# Patient Record
Sex: MALE | Race: WHITE | NOT HISPANIC OR LATINO | Employment: FULL TIME | URBAN - METROPOLITAN AREA
[De-identification: names, ages, dates, MRNs, and addresses within clinical notes are randomized per-mention and may not be internally consistent; named-entity substitution may affect disease eponyms.]

---

## 2024-04-29 ENCOUNTER — OFFICE VISIT (OUTPATIENT)
Dept: NEUROLOGY | Facility: CLINIC | Age: 59
End: 2024-04-29
Payer: COMMERCIAL

## 2024-04-29 VITALS
DIASTOLIC BLOOD PRESSURE: 84 MMHG | SYSTOLIC BLOOD PRESSURE: 128 MMHG | TEMPERATURE: 96.8 F | HEIGHT: 70 IN | HEART RATE: 68 BPM | WEIGHT: 227 LBS | RESPIRATION RATE: 16 BRPM | BODY MASS INDEX: 32.5 KG/M2

## 2024-04-29 DIAGNOSIS — G47.33 OSA (OBSTRUCTIVE SLEEP APNEA): Primary | ICD-10-CM

## 2024-04-29 DIAGNOSIS — G44.221 CHRONIC TENSION-TYPE HEADACHE, INTRACTABLE: ICD-10-CM

## 2024-04-29 DIAGNOSIS — R06.83 SNORING: ICD-10-CM

## 2024-04-29 PROCEDURE — 1036F TOBACCO NON-USER: CPT | Performed by: PSYCHIATRY & NEUROLOGY

## 2024-04-29 PROCEDURE — 99215 OFFICE O/P EST HI 40 MIN: CPT | Performed by: PSYCHIATRY & NEUROLOGY

## 2024-04-29 RX ORDER — ROSUVASTATIN CALCIUM 20 MG/1
20 TABLET, COATED ORAL DAILY
COMMUNITY
Start: 2016-08-24

## 2024-04-29 RX ORDER — HYDROXYZINE HYDROCHLORIDE 25 MG/1
25 TABLET, FILM COATED ORAL EVERY 4 HOURS PRN
COMMUNITY
Start: 2023-12-19

## 2024-04-29 RX ORDER — SILDENAFIL 50 MG/1
50 TABLET, FILM COATED ORAL AS NEEDED
COMMUNITY
Start: 2021-09-22

## 2024-04-29 RX ORDER — INSULIN LISPRO-AABC 100 [IU]/ML
INJECTION, SOLUTION INTRAVENOUS; SUBCUTANEOUS
COMMUNITY
Start: 2021-04-23

## 2024-04-29 RX ORDER — CETIRIZINE HYDROCHLORIDE 10 MG/1
10 TABLET ORAL DAILY
COMMUNITY

## 2024-04-29 RX ORDER — EPINEPHRINE 0.22MG
100 AEROSOL WITH ADAPTER (ML) INHALATION DAILY
COMMUNITY
Start: 2017-11-06

## 2024-04-29 RX ORDER — KETOCONAZOLE 20 MG/G
CREAM TOPICAL
COMMUNITY
Start: 2024-03-06

## 2024-04-29 RX ORDER — BISMUTH SUBSALICYLATE 262 MG
1 TABLET,CHEWABLE ORAL DAILY
COMMUNITY

## 2024-04-29 RX ORDER — CLOBETASOL PROPIONATE 0.5 MG/G
CREAM TOPICAL
COMMUNITY
Start: 2024-03-06

## 2024-04-29 RX ORDER — GABAPENTIN 300 MG/1
300 CAPSULE ORAL 3 TIMES DAILY
Qty: 30 CAPSULE | Refills: 2 | Status: SHIPPED | OUTPATIENT
Start: 2024-04-29 | End: 2025-04-29

## 2024-04-29 RX ORDER — OMEPRAZOLE 40 MG/1
40 CAPSULE, DELAYED RELEASE ORAL
COMMUNITY
Start: 2023-08-07

## 2024-04-29 ASSESSMENT — ENCOUNTER SYMPTOMS
LOSS OF SENSATION IN FEET: 0
OCCASIONAL FEELINGS OF UNSTEADINESS: 0
DEPRESSION: 0

## 2024-04-29 NOTE — PATIENT INSTRUCTIONS
The patient is tolerating his BiPAP better but he is continuing to have headaches..  I do need a copy of the patient's most recent compliance report.  The patient needs to continue to change his mask and clean his machine on a regular basis.  The patient needs to lose weight to his ideal body weight, avoid supine position, improve his sleep hygiene and get a least 8 hours of sleep at night.  The patient should not drive while drowsy.  The patient needs an MRA of the brain without contrast.  I will give him a trial of gabapentin 300 mg p.o. 3 times daily.  I did warn him of the possibility of sedation, weight gain, lower extremity edema and dizziness with this medicine.  The patient should continue all of his medicines and stroke risk factor modification.  The patient should use symptomatic pain medicines for severe headaches.  The patient does need to continue to follow-up with his dentist.  I discussed all these issues in detail with the patient and answered all of his questions.  The patient will follow-up with me in 6 months.

## 2024-04-29 NOTE — PROGRESS NOTES
Subjective     Carmelo Raymond 58 y.o.  HPI  The patient states that he has lights out between 10 PM and 11 PM and it takes him about 30 to 45 minutes to fall asleep.  The patient will wake for the day 7:10 AM.  The patient's [place] Sleepiness Scale score today was 5.  The patient states that he gets up at a minimum of 2-5 times per night.  The patient states that at times his sleep is refreshing and that at times it is not.  I do not have a copy of his latest compliance report.  He is changing his mask and cleaning his machine on a regular basis.  He states that he has tried at least 8 different masks and is now able to use his BiPAP for about 7 hours a night.    The patient states that he has had daily headaches for about the last year and a half.  The patient did have an MRI of the brain back on March 22, 2023 that was normal.  The patient does feel that he still has numbness in the roof of his mouth and this has been present since he had his dental implants.    Review of Systems   All other systems reviewed and are negative.       There is no problem list on file for this patient.       Past Medical History:   Diagnosis Date    Celiac disease ([facility])         Past Surgical History:   Procedure Laterality Date    COLONOSCOPY      UPPER GASTROINTESTINAL ENDOSCOPY          Social History     Socioeconomic History    Marital status:      Spouse name: Not on file    Number of children: Not on file    Years of education: Not on file    Highest education level: Not on file   Occupational History    Not on file   Tobacco Use    Smoking status: Never     Passive exposure: Never    Smokeless tobacco: Former     Types: Chew   Vaping Use    Vaping status: Never Used   Substance and Sexual Activity    Alcohol use: Yes     Comment: moderately    Drug use: Never    Sexual activity: Not on file   Other Topics Concern    Not on file   Social History Narrative    Not on file     Social Determinants of Health  "    Financial Resource Strain: Not on file   Food Insecurity: Not on file   Transportation Needs: Not on file   Physical Activity: Not on file   Stress: Not on file   Social Connections: Not on file   Intimate Partner Violence: Not on file   Housing Stability: Not on file        No family history on file.     Current Outpatient Medications   Medication Instructions    cetirizine (ZYRTEC) 10 mg, oral, Daily    clobetasol (Temovate) 0.05 % cream Topical    coenzyme Q-10 100 mg, oral, Daily    glucagon (Glucagen) 1 mg injection Once as needed    hydrOXYzine HCL (ATARAX) 25 mg, oral, Every 4 hours PRN    ketoconazole (NIZOral) 2 % cream Topical    Lyumjev U-100 Insulin 100 unit/mL solution On pump    multivitamin tablet 1 tablet, oral, Daily    omeprazole (PRILOSEC) 40 mg, oral, Daily before breakfast    rosuvastatin (CRESTOR) 20 mg, oral, Daily    sildenafil (VIAGRA) 50 mg, oral, As needed        No Known Allergies     Objective  /84   Pulse 68   Temp 36 °C (96.8 °F)   Resp 16   Ht 1.778 m (5' 10\")   Wt 103 kg (227 lb)   BMI 32.57 kg/m²    GENERAL APPEARANCE:  No distress, alert and cooperative.     MENTAL STATE:  Orientation was normal to time, place and person. Recent and remote memory was intact.  Attention span and concentration were normal. Language testing was normal for comprehension, repetition, expression, and naming. Calculation was intact. The patient could correctly interpret a picture, and copy a diagram. General fund of knowledge was intact. Mini-mental status examination was performed with no errors.     CRANIAL NERVES:  Cranial nerves were normal.      CN 2- Visual Acuity  OD: 20/20 (corrected)   OS: 20/20 (corrected); visual fields full to confrontation.      CN 3, 4, 6-  Pupils round, 4 mm in diameter, equally reactive to light. No ptosis. EOMs normal alignment, full range of movement, no nystagmus     CN 5- Facial sensation intact bilaterally. Normal corneal reflexes.      CN 7- Normal " and symmetric facial strength. Nasolabial folds symmetric.     CN 8- Hearing intact to finger rub, whisper.      CN 9- Palate elevates symmetrically. Normal gag reflex.      CN 11- Normal strength of shoulder shrug and neck turning      CN 12- Tongue midline, with normal bulk and strength; no fasciculations.     MOTOR:  Motor exam was normal. Muscle bulk and tone were normal in both upper and lower extremities. Muscle strength was 5/5 in distal and proximal muscles in both upper and lower extremities. No fasciculations, tremor or other abnormal movements were present.     GAIT: Station was stable with a normal base and negative Romberg sign. Gait was stable with a normal arm swing and speed. No ataxia, shuffling, steppage or waddling was noted. Tandem gait was intact. Postural reflexes were normal.     Assessment/Plan   The patient is tolerating his BiPAP better but he is continuing to have headaches..  I do need a copy of the patient's most recent compliance report.  The patient needs to continue to change his mask and clean his machine on a regular basis.  The patient needs to lose weight to his ideal body weight, avoid supine position, improve his sleep hygiene and get a least 8 hours of sleep at night.  The patient should not drive while drowsy.  The patient needs an MRA of the brain without contrast.  I will give him a trial of gabapentin 300 mg p.o. 3 times daily.  I did warn him of the possibility of sedation, weight gain, lower extremity edema and dizziness with this medicine.  The patient should continue all of his medicines and stroke risk factor modification.  The patient should use symptomatic pain medicines for severe headaches.  The patient does need to continue to follow-up with his dentist.  I discussed all these issues in detail with the patient and answered all of his questions.  The patient will follow-up with me in 6 months.

## 2024-05-29 ENCOUNTER — HOSPITAL ENCOUNTER (OUTPATIENT)
Dept: RADIOLOGY | Facility: CLINIC | Age: 59
Discharge: HOME | End: 2024-05-29
Payer: COMMERCIAL

## 2024-05-29 DIAGNOSIS — G44.221 CHRONIC TENSION-TYPE HEADACHE, INTRACTABLE: ICD-10-CM

## 2024-05-29 PROCEDURE — 70544 MR ANGIOGRAPHY HEAD W/O DYE: CPT | Performed by: RADIOLOGY

## 2024-05-29 PROCEDURE — 70544 MR ANGIOGRAPHY HEAD W/O DYE: CPT

## 2024-06-03 DIAGNOSIS — G44.221 CHRONIC TENSION-TYPE HEADACHE, INTRACTABLE: ICD-10-CM

## 2024-06-03 RX ORDER — DIVALPROEX SODIUM 500 MG/1
500 TABLET, DELAYED RELEASE ORAL DAILY
Qty: 30 TABLET | Refills: 2 | Status: SHIPPED | OUTPATIENT
Start: 2024-06-03 | End: 2024-09-01

## 2024-07-16 ENCOUNTER — APPOINTMENT (OUTPATIENT)
Dept: NEUROLOGY | Facility: CLINIC | Age: 59
End: 2024-07-16
Payer: COMMERCIAL

## 2024-07-16 DIAGNOSIS — G44.221 CHRONIC TENSION-TYPE HEADACHE, INTRACTABLE: ICD-10-CM

## 2024-07-16 PROCEDURE — 99204 OFFICE O/P NEW MOD 45 MIN: CPT | Performed by: NURSE PRACTITIONER

## 2024-07-16 RX ORDER — DULOXETIN HYDROCHLORIDE 30 MG/1
30 CAPSULE, DELAYED RELEASE ORAL NIGHTLY
Qty: 30 CAPSULE | Refills: 1 | Status: SHIPPED | OUTPATIENT
Start: 2024-07-16

## 2024-07-16 NOTE — PROGRESS NOTES
Patient being assessed today for initial evaluation of headache.  He reports about a year ago he had dental implants placed and since that time he has had a headache all day every day.  It does sometimes become less intense throughout the day although he still has some type of head pain.  He describes it as a throbbing in his temples down to the roof of his mouth.  Denies nausea.  Denies vomiting.  Denies photophobia.  Denies phonophobia.  Denies aura.  Denies dizziness.  Denies speech and language deficits.  Denies numbness and tingling.  He has followed with the dental surgeon and other specialists.  Was recently diagnosed with obstructive sleep apnea and was started on CPAP treatment.  Additionally, was recently diagnosed with celiac's disease.  He has tried gabapentin in the past for treatment which was not effective.  Would like to try him on duloxetine 30 mg p.o. nightly.  Discussed role of medicine, importance of taking medications, potential risks, benefits, and precautions to be taken.  Reviewed sleep hygiene and dietary modifications.  Follow-up in 6 to 8 weeks.    This note was created with voice recognition software and was not corrected for typographical or grammatical errors

## 2024-08-28 ENCOUNTER — APPOINTMENT (OUTPATIENT)
Dept: NEUROLOGY | Facility: CLINIC | Age: 59
End: 2024-08-28
Payer: COMMERCIAL

## 2024-08-28 DIAGNOSIS — G44.221 CHRONIC TENSION-TYPE HEADACHE, INTRACTABLE: Primary | ICD-10-CM

## 2024-08-28 PROCEDURE — 99214 OFFICE O/P EST MOD 30 MIN: CPT | Performed by: NURSE PRACTITIONER

## 2024-08-28 RX ORDER — VERAPAMIL HYDROCHLORIDE 80 MG/1
80 TABLET ORAL DAILY
Qty: 30 TABLET | Refills: 1 | Status: SHIPPED | OUTPATIENT
Start: 2024-08-28

## 2024-08-28 NOTE — PROGRESS NOTES
Patient being assessed today for follow-up of chronic tension type headache.  He has not noticed any improvement in using the duloxetine.  We will titrate down to discontinue.  He has also tried nortriptyline and gabapentin in the past which have not been effective.  Would like to try him on verapamil 80 mg daily.  He reports that he is going to have the dental implants removed to see if that is what is contributing since everything started from that.  Discussed role of medicine, importance of taking medications, potential risks, benefits, and precautions to be taken.  Reviewed sleep hygiene and dietary modifications.  Follow-up in 8 weeks.    This note was created with voice recognition software and was not corrected for typographical or grammatical errors

## 2024-09-25 ENCOUNTER — CONSULT (OUTPATIENT)
Dept: ALLERGY | Facility: CLINIC | Age: 59
End: 2024-09-25
Payer: COMMERCIAL

## 2024-09-25 VITALS
WEIGHT: 227 LBS | DIASTOLIC BLOOD PRESSURE: 70 MMHG | TEMPERATURE: 98.4 F | HEART RATE: 78 BPM | RESPIRATION RATE: 17 BRPM | BODY MASS INDEX: 32.5 KG/M2 | HEIGHT: 70 IN | SYSTOLIC BLOOD PRESSURE: 124 MMHG | OXYGEN SATURATION: 96 %

## 2024-09-25 DIAGNOSIS — R51.9 CHRONIC NONINTRACTABLE HEADACHE, UNSPECIFIED HEADACHE TYPE: ICD-10-CM

## 2024-09-25 DIAGNOSIS — G89.29 CHRONIC NONINTRACTABLE HEADACHE, UNSPECIFIED HEADACHE TYPE: ICD-10-CM

## 2024-09-25 DIAGNOSIS — J30.1 SEASONAL ALLERGIC RHINITIS DUE TO POLLEN: Primary | ICD-10-CM

## 2024-09-25 DIAGNOSIS — J30.81 ALLERGIC RHINITIS DUE TO CATS: ICD-10-CM

## 2024-09-25 PROCEDURE — 95004 PERQ TESTS W/ALRGNC XTRCS: CPT | Performed by: ALLERGY & IMMUNOLOGY

## 2024-09-25 PROCEDURE — 99204 OFFICE O/P NEW MOD 45 MIN: CPT | Performed by: ALLERGY & IMMUNOLOGY

## 2024-09-25 RX ORDER — AZELASTINE 1 MG/ML
2 SPRAY, METERED NASAL 2 TIMES DAILY
Qty: 30 ML | Refills: 5 | Status: SHIPPED | OUTPATIENT
Start: 2024-09-25 | End: 2025-09-25

## 2024-09-25 RX ORDER — FEXOFENADINE HCL AND PSEUDOEPHEDRINE HCL 180; 240 MG/1; MG/1
1 TABLET, EXTENDED RELEASE ORAL DAILY PRN
Qty: 30 TABLET | Refills: 5 | Status: SHIPPED | OUTPATIENT
Start: 2024-09-25 | End: 2025-03-24

## 2024-09-25 NOTE — PATIENT INSTRUCTIONS
Allergy to cat, pollens (Tree, grass, ragweed and weed)        If you have pets, avoid having them in the bedroom if possible.  Regular grooming and wiped the pet with a pet allergy wipe will help with dander and allergens on the pet's fur.  Vacuum regularly.      For pollen allergy, keep windows closed and use central air.  You can consider wearing a hat and sunglasses as well to reduce pollen exposure.  After being outside, wash hands and face or shower to reduce the pollen on your body.  Wash clothing after wearing outside during the pollen seasons.       Try daily antihistamine, nasal steroid until then frost and with cat exposure.

## 2024-09-25 NOTE — PROGRESS NOTES
"Patient ID: Carmelo Raymond \"Carlos" is a 59 y.o. male.     Chief Complaint: NPV referred by Dr. Ellison  History Of Present Illness  Carmelo Raymond \"Carlos" is a 59 y.o. male with PMx headaches presenting for consultation.   Implants are out.    Does not plan on putting back in  No history of rashes with contact to metal, but was recommended to have testing.      Food Allergy  Celiac disease    Eczema/ Atopic Dermatitis  No history of contact dermatitis.      Asthma  Patient has sleep apnea.    Rhinoconjunctivitis  He does have congestion and rhinorrhea. Uses Flonase and as needed antihistamine    Drug Allergy   No    Insect Allergy   No    Infections  No history of frequent or recurrent infections      Review of Systems    Pertinent positives and negatives have been assessed in the HPI. All other systems have been reviewed and are negative except as noted in the HPI.    Allergies  Patient has no known allergies.    Past Medical History  He has a past medical history of Celiac disease ([facility]    Family History  No family history on file.    Surgical History  He has a past surgical history that includes Colonoscopy and Upper gastrointestinal endoscopy.    Social/Environmental History  He reports that he has never smoked. He has never been exposed to tobacco smoke. He has quit using smokeless tobacco.  His smokeless tobacco use included chew. He reports current alcohol use. He reports that he does not use drugs.    Home: Lives in  house   Floors: wood carpet and tile  Air Conditioning: Central  Smoker: no history of cigarette smoking  Pets: 2 dogs  Infestations: No  Molds: No  Occupation: works in an office and caters on the side.    MEDICATIONS  Current Outpatient Medications on File Prior to Visit   Medication Sig Dispense Refill    cetirizine (ZyrTEC) 10 mg tablet Take 1 tablet (10 mg) by mouth once daily.      clobetasol (Temovate) 0.05 % cream Apply topically.      coenzyme Q-10 100 mg capsule Take 1 capsule " "(100 mg) by mouth once daily.      glucagon (Glucagen) 1 mg injection 1 time if needed.      hydrOXYzine HCL (Atarax) 25 mg tablet Take 1 tablet (25 mg) by mouth every 4 hours if needed.      ketoconazole (NIZOral) 2 % cream Apply topically.      Lyumjev U-100 Insulin 100 unit/mL solution On pump      multivitamin tablet Take 1 tablet by mouth once daily.      omeprazole (PriLOSEC) 40 mg DR capsule Take 1 capsule (40 mg) by mouth once daily in the morning. Take before meals.      rosuvastatin (Crestor) 20 mg tablet Take 1 tablet (20 mg) by mouth once daily.      sildenafil (Viagra) 50 mg tablet Take 1 tablet (50 mg) by mouth if needed.      verapamil (Calan) 80 mg tablet Take 1 tablet (80 mg) by mouth once daily. 30 tablet 1     No current facility-administered medications on file prior to visit.     Physical Exam  Visit Vitals  /70   Pulse 78   Temp 36.9 °C (98.4 °F) (Temporal)   Resp 17   Ht 1.778 m (5' 10\")   Wt 103 kg (227 lb)   SpO2 96%   BMI 32.57 kg/m²   Smoking Status Never   BSA 2.26 m²       Wt Readings from Last 1 Encounters:   09/25/24 103 kg (227 lb)       Physical Exam    General: Well appearing, no acute distress  Head: Normocephalic, atraumatic, neck supple without lymphadenopathy  Eyes: non-injected  Nose: No nasal crease, nares patent, slightly boggy turbinates, minimal discharge  Throat: Normal dentition, no erythema  Heart: Regular rate and rhythm  Lungs: Clear to auscultation bilaterally, effort normal  Abdomen: Soft, non-tender, normal bowel sounds  Extremities: Moves all extremities symmetrically, no edema  Skin: No rashes/lesions  Psych: normal mood and affect    LAB RESULTS:    CMP:  Recent Labs     10/03/19  1227      K 4.4      CO2 29   ANIONGAP 11   BUN 12   CREATININE 0.96       HEME/ENDO:  Recent Labs     09/16/24  0801 03/13/24  0758 10/03/19  1227   TSH  --   --  1.41   HGBA1C 8.1* 7.7* 8.1   Allergy skin test  Assessment/Plan   Hank is a 60 yo with history of " headache. Concern for contact allergy as a trigger. He also has allergic rhinitis and there is a significant response to Cat, tree pollen, grass pollen and weed pollen  -we discussed HA is not likely related to a contact allergy and I do not recommend patch test  -We discussed allergy avoidance measures and medications  -Follow up if avoidance and medications are not helping.  -Continue HA evaluation with Neurology.    Diane Xie Orlando, DO

## 2024-10-02 DIAGNOSIS — J30.1 SEASONAL ALLERGIC RHINITIS DUE TO POLLEN: ICD-10-CM

## 2024-10-02 RX ORDER — FEXOFENADINE HCL AND PSEUDOEPHEDRINE HCL 180; 240 MG/1; MG/1
1 TABLET, EXTENDED RELEASE ORAL DAILY PRN
Qty: 30 TABLET | Refills: 5 | Status: SHIPPED | OUTPATIENT
Start: 2024-10-02 | End: 2025-03-31

## 2024-10-02 RX ORDER — FEXOFENADINE HCL AND PSEUDOEPHEDRINE HCL 180; 240 MG/1; MG/1
1 TABLET, EXTENDED RELEASE ORAL DAILY PRN
Qty: 30 TABLET | Refills: 5 | Status: SHIPPED | OUTPATIENT
Start: 2024-10-02 | End: 2024-10-02 | Stop reason: WASHOUT

## 2024-10-08 ENCOUNTER — APPOINTMENT (OUTPATIENT)
Dept: NEUROLOGY | Facility: CLINIC | Age: 59
End: 2024-10-08
Payer: COMMERCIAL

## 2024-10-22 ENCOUNTER — APPOINTMENT (OUTPATIENT)
Dept: ALLERGY | Facility: CLINIC | Age: 59
End: 2024-10-22
Payer: COMMERCIAL

## 2024-10-23 ENCOUNTER — TELEMEDICINE (OUTPATIENT)
Dept: NEUROLOGY | Facility: CLINIC | Age: 59
End: 2024-10-23
Payer: COMMERCIAL

## 2024-10-23 DIAGNOSIS — G44.221 CHRONIC TENSION-TYPE HEADACHE, INTRACTABLE: Primary | ICD-10-CM

## 2024-10-23 PROCEDURE — 99214 OFFICE O/P EST MOD 30 MIN: CPT | Mod: 95 | Performed by: NURSE PRACTITIONER

## 2024-10-23 PROCEDURE — 99214 OFFICE O/P EST MOD 30 MIN: CPT | Performed by: NURSE PRACTITIONER

## 2024-10-23 RX ORDER — DIVALPROEX SODIUM 250 MG/1
TABLET, DELAYED RELEASE ORAL
Qty: 16 TABLET | Refills: 0 | Status: SHIPPED | OUTPATIENT
Start: 2024-10-23 | End: 2024-10-30

## 2024-10-23 RX ORDER — VERAPAMIL HYDROCHLORIDE 180 MG/1
180 TABLET, EXTENDED RELEASE ORAL NIGHTLY
Qty: 30 TABLET | Refills: 1 | Status: SHIPPED | OUTPATIENT
Start: 2024-10-23

## 2024-10-23 NOTE — PROGRESS NOTES
Patient being assessed today for follow-up of chronic tension headache.  He reports that the verapamil at 80 mg has not been effective.  He did get his implants removed and since that time it seems like they may have gotten worse in intensity.  He still experiencing them daily all day.  We we will give him a tapering dose of Depakote as patient cannot use prednisone due to type 1 diabetes.  Will also increase the verapamil to 180 mg.  Discussed role of medicine, importance of taking medications, potential risks, benefits, and precautions to be taken.  Reviewed sleep hygiene and dietary modifications.  Patient to notify office of efficacy in 3 to 4 weeks.  Follow-up in 3 months.    This note was created with voice recognition software and was not corrected for typographical or grammatical errors

## 2024-11-19 ENCOUNTER — APPOINTMENT (OUTPATIENT)
Dept: NEUROLOGY | Facility: CLINIC | Age: 59
End: 2024-11-19
Payer: COMMERCIAL

## 2024-11-19 VITALS
TEMPERATURE: 97 F | SYSTOLIC BLOOD PRESSURE: 136 MMHG | DIASTOLIC BLOOD PRESSURE: 80 MMHG | HEART RATE: 77 BPM | RESPIRATION RATE: 16 BRPM | HEIGHT: 70 IN | WEIGHT: 217.8 LBS | BODY MASS INDEX: 31.18 KG/M2

## 2024-11-19 DIAGNOSIS — R06.83 SNORING: ICD-10-CM

## 2024-11-19 DIAGNOSIS — G47.33 OSA (OBSTRUCTIVE SLEEP APNEA): Primary | ICD-10-CM

## 2024-11-19 DIAGNOSIS — G44.221 CHRONIC TENSION-TYPE HEADACHE, INTRACTABLE: ICD-10-CM

## 2024-11-19 DIAGNOSIS — G43.E09 CHRONIC MIGRAINE WITH AURA WITHOUT STATUS MIGRAINOSUS, NOT INTRACTABLE: ICD-10-CM

## 2024-11-19 PROCEDURE — 3008F BODY MASS INDEX DOCD: CPT | Performed by: PSYCHIATRY & NEUROLOGY

## 2024-11-19 PROCEDURE — 4004F PT TOBACCO SCREEN RCVD TLK: CPT | Performed by: PSYCHIATRY & NEUROLOGY

## 2024-11-19 PROCEDURE — 99215 OFFICE O/P EST HI 40 MIN: CPT | Performed by: PSYCHIATRY & NEUROLOGY

## 2024-11-19 RX ORDER — DIVALPROEX SODIUM 250 MG/1
250 TABLET, FILM COATED, EXTENDED RELEASE ORAL DAILY
Qty: 30 TABLET | Refills: 2 | Status: SHIPPED | OUTPATIENT
Start: 2024-11-19 | End: 2024-11-19 | Stop reason: SDUPTHER

## 2024-11-19 RX ORDER — DIVALPROEX SODIUM 250 MG/1
250 TABLET, FILM COATED, EXTENDED RELEASE ORAL DAILY
Qty: 90 TABLET | Refills: 3 | Status: SHIPPED | OUTPATIENT
Start: 2024-11-19 | End: 2025-11-19

## 2024-11-19 ASSESSMENT — ENCOUNTER SYMPTOMS
LOSS OF SENSATION IN FEET: 0
DEPRESSION: 0
OCCASIONAL FEELINGS OF UNSTEADINESS: 0

## 2024-11-19 NOTE — PATIENT INSTRUCTIONS
The patient is tolerating his BiPAP better but he is continuing to have headaches.  I do need a copy of the patient's most recent compliance report.  The patient needs to continue to change his mask and clean his machine on a regular basis.  The patient should continue to use a chinstrap.  The patient needs to lose weight to his ideal body weight, avoid supine position, improve his sleep hygiene and get a least 8 hours of sleep at night.  The patient should not drive while drowsy.  The patient needs an MRV of the brain without contrast.  I would like the patient to take his Depakote as directed by ALAINA Portillo.  After approximately 4 to 6 weeks, if he still having headaches I would certainly consider giving him Emgality.  The patient should continue all of his medicines and stroke risk factor modification.  The patient should use symptomatic pain medicines for severe headaches.  I discussed all these issues in detail with the patient and answered all of his questions.  The patient will follow-up with me in 6 months.

## 2024-11-19 NOTE — PROGRESS NOTES
Subjective     Carmelo Raymond 59 y.o.  HPI  The patient states that he has lights out at 11 PM and it takes him about an hour to fall asleep.  The patient will wake for the day at 7 AM.  The patient does not feel that his sleep is refreshing.  The patient is not napping during the day and his [place] Sleepiness Scale score today was 4.  The patient states that since he has been using a chinstrap he feels that his BiPAP has been working much better.  The patient has a nasal mask.  The patient is using his BiPAP every night and all night.  He is changing his mask and cleaning his machine with soap and water on a regular basis.  I do not have a copy of his latest compliance report.  I did review the patient's CPAP usage on his phone.  It shows that he is using his CPAP every night for approximately 5 hours a night and his AHI is about 3.0.  He has no mask leak.    The patient states that his headaches are throbbing and in his temporal areas bilaterally.  He states that they can be up to a 10 out of 10 in severity.  The patient occasionally has nausea but no vomiting.  He denies any photo or phonophobia.  The patient states that if he goes in a dark room with his headaches that helps but he does not have to.  The patient has tried 5 different prophylactic medicine without success.    The patient did have his dental implants removed  but he does not feel that this has made any difference in terms of his headaches.  The patient recently saw ALAINA Portillo who started him on Depakote but the patient has not started as of yet.      The patient's MRA of the brain was normal.        Review of Systems   All other systems reviewed and are negative.       Patient Active Problem List   Diagnosis    Chronic tension-type headache, intractable        Past Medical History:   Diagnosis Date    Celiac disease ([facility])         Past Surgical History:   Procedure Laterality Date    COLONOSCOPY      UPPER GASTROINTESTINAL ENDOSCOPY           Social History     Socioeconomic History    Marital status:      Spouse name: Not on file    Number of children: Not on file    Years of education: Not on file    Highest education level: Not on file   Occupational History    Not on file   Tobacco Use    Smoking status: Never     Passive exposure: Never    Smokeless tobacco: Current     Types: Chew    Tobacco comments:     Only some days user    Vaping Use    Vaping status: Never Used   Substance and Sexual Activity    Alcohol use: Yes     Comment: moderately    Drug use: Not Currently     Types: Marijuana    Sexual activity: Not on file   Other Topics Concern    Not on file   Social History Narrative    Not on file     Social Drivers of Health     Financial Resource Strain: Not on file   Food Insecurity: Not on file   Transportation Needs: Not on file   Physical Activity: Not on file   Stress: Not on file   Social Connections: Not on file   Intimate Partner Violence: Not on file   Housing Stability: Not on file        No family history on file.     Current Outpatient Medications   Medication Instructions    azelastine (Astelin) 137 mcg (0.1 %) nasal spray 2 sprays, Each Nostril, 2 times daily, Use in each nostril as directed    cetirizine (ZYRTEC) 10 mg, Daily    clobetasol (Temovate) 0.05 % cream Apply topically.    coenzyme Q-10 100 mg, Daily    divalproex (Depakote) 250 mg EC tablet Take 3 tablets (750 mg) by mouth once daily for 3 days, THEN 2 tablets (500 mg) once daily for 3 days, THEN 1 tablet (250 mg) once daily for 1 day. Do not crush, chew, or split..    fexofenadine-pseudoephedrine (Allegra-D 24) 180-240 mg 24 hr tablet 1 tablet, oral, Daily PRN, Do not crush, chew, or split.    glucagon (Glucagen) 1 mg injection Once as needed    hydrOXYzine HCL (ATARAX) 25 mg, Every 4 hours PRN    ketoconazole (NIZOral) 2 % cream Apply topically.    Lyumjev U-100 Insulin 100 unit/mL solution On pump    multivitamin tablet 1 tablet, Daily    omeprazole  "(PRILOSEC) 40 mg, Daily before breakfast    rosuvastatin (CRESTOR) 20 mg, Daily    sildenafil (VIAGRA) 50 mg, As needed    verapamil SR (CALAN-SR) 180 mg, oral, Nightly, Do not crush or chew.        No Known Allergies     Objective  /80 (BP Location: Right arm)   Pulse 77   Temp 36.1 °C (97 °F)   Resp 16   Ht 1.778 m (5' 10\")   Wt 98.8 kg (217 lb 12.8 oz)   BMI 31.25 kg/m²    GENERAL APPEARANCE:  No distress, alert and cooperative.     MENTAL STATE:  Orientation was normal to time, place and person. Recent and remote memory was intact.  Attention span and concentration were normal. Language testing was normal for comprehension, repetition, expression, and naming. Calculation was intact. The patient could correctly interpret a picture, and copy a diagram. General fund of knowledge was intact. Mini-mental status examination was performed with no errors.     CRANIAL NERVES:  Cranial nerves were normal.      CN 2- Visual Acuity  OD: 20/20 (corrected)   OS: 20/20 (corrected); visual fields full to confrontation.      CN 3, 4, 6-  Pupils round, 4 mm in diameter, equally reactive to light. No ptosis. EOMs normal alignment, full range of movement, no nystagmus     CN 5- Facial sensation intact bilaterally. Normal corneal reflexes.      CN 7- Normal and symmetric facial strength. Nasolabial folds symmetric.     CN 8- Hearing intact to finger rub, whisper.      CN 9- Palate elevates symmetrically. Normal gag reflex.      CN 11- Normal strength of shoulder shrug and neck turning      CN 12- Tongue midline, with normal bulk and strength; no fasciculations.     MOTOR:  Motor exam was normal. Muscle bulk and tone were normal in both upper and lower extremities. Muscle strength was 5/5 in distal and proximal muscles in both upper and lower extremities. No fasciculations, tremor or other abnormal movements were present.     GAIT: Station was stable with a normal base and negative Romberg sign. Gait was stable with a " normal arm swing and speed. No ataxia, shuffling, steppage or waddling was noted. Tandem gait was intact. Postural reflexes were normal.     Assessment/Plan   The patient is tolerating his BiPAP better but he is continuing to have headaches.  I do need a copy of the patient's most recent compliance report.  The patient needs to continue to change his mask and clean his machine on a regular basis.  The patient should continue to use a chinstrap.  The patient needs to lose weight to his ideal body weight, avoid supine position, improve his sleep hygiene and get a least 8 hours of sleep at night.  The patient should not drive while drowsy.  The patient needs an MRV of the brain without contrast.  I would like the patient to take his Depakote as directed by ALAINA Portillo.  After approximately 4 to 6 weeks, if he still having headaches I would certainly consider giving him Emgality.  The patient should continue all of his medicines and stroke risk factor modification.  The patient should use symptomatic pain medicines for severe headaches.  I discussed all these issues in detail with the patient and answered all of his questions.  The patient will follow-up with me in 6 months.

## 2024-12-17 ENCOUNTER — APPOINTMENT (OUTPATIENT)
Dept: RADIOLOGY | Facility: HOSPITAL | Age: 59
End: 2024-12-17
Payer: COMMERCIAL

## 2024-12-26 ENCOUNTER — TELEPHONE (OUTPATIENT)
Dept: NEUROLOGY | Facility: CLINIC | Age: 59
End: 2024-12-26
Payer: COMMERCIAL

## 2024-12-26 DIAGNOSIS — G43.E09 CHRONIC MIGRAINE WITH AURA WITHOUT STATUS MIGRAINOSUS, NOT INTRACTABLE: ICD-10-CM

## 2024-12-26 NOTE — TELEPHONE ENCOUNTER
----- Message from Ishmael Cameron sent at 12/20/2024  8:26 AM EST -----  Regarding: RE: MRV brain results  I would like to start the patient on Emgality.  Please give him a loading dose of 240 mg the first month followed by 120 mg after that.  He can discontinue his Depakote after he starts the Emgality.    Please have him call me 2 weeks after his second dose.  ----- Message -----  From: Bonita Santos MA  Sent: 12/19/2024   2:41 PM EST  To: Ishmael Cameron MD  Subject: RE: MRV brain results                            Ok pt is aware of results.   He states he is still getting daily headaches and the medication you prescribed isn't helping (I believe he is referring to the divalproex 250mg)  Please advise.  ----- Message -----  From: Ishmael Cameron MD  Sent: 12/19/2024   1:26 PM EST  To: Bonita Santos MA  Subject: MRV brain results                                Please let him know that the MRV of the brain is normal  ----- Message -----  From: Kelsy Marks  Sent: 12/19/2024   8:03 AM EST  To: Ishmael Cameron MD

## 2024-12-26 NOTE — TELEPHONE ENCOUNTER
----- Message from Bonita JENKINS sent at 12/26/2024 11:09 AM EST -----  Regarding: RE: MRV brain results  Ok pt is aware of Emgality prescription and that he will need to do 2 pens for loading dose then 1 pen every month after, can stop depakote once starts emgality.  ----- Message -----  From: Ishmael Cameron MD  Sent: 12/20/2024   8:28 AM EST  To: Bonita Santos MA  Subject: RE: MRV brain results                            I would like to start the patient on Emgality.  Please give him a loading dose of 240 mg the first month followed by 120 mg after that.  He can discontinue his Depakote after he starts the Emgality.    Please have him call me 2 weeks after his second dose.  ----- Message -----  From: Bonita Santos MA  Sent: 12/19/2024   2:41 PM EST  To: Ishmael Cameron MD  Subject: RE: MRV brain results                            Ok pt is aware of results.   He states he is still getting daily headaches and the medication you prescribed isn't helping (I believe he is referring to the divalproex 250mg)  Please advise.  ----- Message -----  From: Ishmael Cameron MD  Sent: 12/19/2024   1:26 PM EST  To: Bonita Santos MA  Subject: MRV brain results                                Please let him know that the MRV of the brain is normal  ----- Message -----  From: Kelsy Marks  Sent: 12/19/2024   8:03 AM EST  To: Ishmael Cameron MD

## 2025-02-05 DIAGNOSIS — G44.221 CHRONIC TENSION-TYPE HEADACHE, INTRACTABLE: ICD-10-CM

## 2025-02-05 DIAGNOSIS — G43.E09 CHRONIC MIGRAINE WITH AURA WITHOUT STATUS MIGRAINOSUS, NOT INTRACTABLE: ICD-10-CM
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
## 2025-02-27 ENCOUNTER — APPOINTMENT (OUTPATIENT)
Dept: NEUROLOGY | Facility: CLINIC | Age: 60
End: 2025-02-27
Payer: COMMERCIAL

## 2025-02-27 VITALS — DIASTOLIC BLOOD PRESSURE: 81 MMHG | TEMPERATURE: 97.5 F | HEART RATE: 66 BPM | SYSTOLIC BLOOD PRESSURE: 138 MMHG

## 2025-02-27 DIAGNOSIS — G44.221 CHRONIC TENSION-TYPE HEADACHE, INTRACTABLE: ICD-10-CM

## 2025-02-27 DIAGNOSIS — R51.9 CHRONIC FACIAL PAIN: ICD-10-CM

## 2025-02-27 DIAGNOSIS — G43.E09 CHRONIC MIGRAINE WITH AURA WITHOUT STATUS MIGRAINOSUS, NOT INTRACTABLE: ICD-10-CM

## 2025-02-27 DIAGNOSIS — G50.0 TRIGEMINAL NEURALGIA PAIN: Primary | ICD-10-CM

## 2025-02-27 DIAGNOSIS — G89.29 CHRONIC FACIAL PAIN: ICD-10-CM

## 2025-02-27 PROCEDURE — 99417 PROLNG OP E/M EACH 15 MIN: CPT

## 2025-02-27 PROCEDURE — 96372 THER/PROPH/DIAG INJ SC/IM: CPT

## 2025-02-27 PROCEDURE — 99215 OFFICE O/P EST HI 40 MIN: CPT

## 2025-02-27 RX ORDER — KETOROLAC TROMETHAMINE 10 MG/1
10 TABLET, FILM COATED ORAL EVERY 6 HOURS
Qty: 20 TABLET | Refills: 0 | Status: SHIPPED | OUTPATIENT
Start: 2025-02-27 | End: 2025-03-04

## 2025-02-27 RX ORDER — MELOXICAM 15 MG/1
15 TABLET ORAL DAILY
Qty: 30 TABLET | Refills: 2 | Status: SHIPPED | OUTPATIENT
Start: 2025-02-27 | End: 2026-02-27

## 2025-02-27 RX ORDER — KETOROLAC TROMETHAMINE 30 MG/ML
60 INJECTION, SOLUTION INTRAMUSCULAR; INTRAVENOUS ONCE
Status: COMPLETED | OUTPATIENT
Start: 2025-02-27 | End: 2025-02-27

## 2025-02-27 RX ORDER — CARBAMAZEPINE 100 MG/1
100 TABLET, EXTENDED RELEASE ORAL 2 TIMES DAILY
Qty: 60 TABLET | Refills: 2 | Status: SHIPPED | OUTPATIENT
Start: 2025-02-27 | End: 2025-05-28

## 2025-02-27 RX ADMIN — KETOROLAC TROMETHAMINE 60 MG: 30 INJECTION, SOLUTION INTRAMUSCULAR; INTRAVENOUS at 09:30

## 2025-02-27 ASSESSMENT — PAIN SCALES - GENERAL: PAINLEVEL_OUTOF10: 5
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
## 2025-02-27 NOTE — PATIENT INSTRUCTIONS
Dear Carmelo,    Thank you for coming to [facility]. It was a pleasure caring for you today.  The best way to contact me for medication refills or questions about your care is through [employer].  Otherwise, you can contact the office by phone: [phone].    JORDAN Hopper-CNP      PLAN:  Toradol protocol today  Tegretol- start with once a day, increase to twice  Plan for SPG block if no improvement  Get labs done  ENT Referral [facility] phone number is [phone].   Message to let me know how you are doing  Follow up in 6 weeks    You received Toradol (ketorolac) today for your severe headache.  We are going to continue with 5 days of pills starting tomorrow to break your headache cycle.  Take 1 pill with breakfast, lunch, dinner & at bedtime for 5 days.    Please take with food.    Try to avoid taking your triptan or antiinflammatory medication during this time.    If you have any nausea or diarrhea with the medication, stop & send a message via [employer], or call the next business day.

## 2025-03-10 ENCOUNTER — TELEPHONE (OUTPATIENT)
Dept: NEUROLOGY | Facility: CLINIC | Age: 60
End: 2025-03-10
Payer: COMMERCIAL

## 2025-03-10 NOTE — TELEPHONE ENCOUNTER
Hank called stating that he is to come in for a 20 minute procedure?  (Cepelie).   Cinthya said that we are waiting on Prior auth for SPG block.

## 2025-03-12 ENCOUNTER — TELEPHONE (OUTPATIENT)
Dept: NEUROLOGY | Facility: CLINIC | Age: 60
End: 2025-03-12
Payer: COMMERCIAL

## 2025-03-12 DIAGNOSIS — J34.2 DEVIATED NASAL SEPTUM: ICD-10-CM

## 2025-03-12 DIAGNOSIS — G44.221 CHRONIC TENSION-TYPE HEADACHE, INTRACTABLE: ICD-10-CM

## 2025-03-12 DIAGNOSIS — G89.29 CHRONIC FACIAL PAIN: Primary | ICD-10-CM

## 2025-03-12 DIAGNOSIS — R51.9 CHRONIC FACIAL PAIN: Primary | ICD-10-CM

## 2025-03-12 NOTE — PROGRESS NOTES
Patient called, further discussed MRI findings. Message sent to patient with updated information/ treatment plan.

## 2025-03-14 NOTE — PROGRESS NOTES
Facial Plastic & Reconstructive Surgery    Referring Provider: Dr. Perdomo, Neurology  CC: No ref. provider found    Chief complaint: Nasal obstruction.    HPI     Carmelo Raymond is a pleasant 59 y.o. male with PMHx of PHILL CPAP***, GRD, tobacco chewer, marijuana smoking, trigeminal neuralgia/migraines s/p dental implants in 2023, who presents in consultation for the evaluation of nasal obstruction.  The patient reports obstructed breathing, which is constant, present year round, does not fluctuate. It affects the patients ability to sleep, exercise, and is troubling during the day.   Symptoms began over three months ago.  The patient has used nasal steroid sprays for greater than 8 weeks without any benefit. The patient has trailed nasal cones/breathe right strips.      Site of obstruction:  {LEFT/RIGHT/BI:06901}  Previous surgery: Denies  Trauma history: none  Allergic rhinitis, sinusitis history: denies  Smoking: denies  PHILL: denies    Aesthetic concern:   ***    History obtained from: {PATIENT DECISION MAKER:91200}    Past Medical History  He has a past medical history of Celiac disease ([facility]).    Past Surgical History  He has a past surgical history that includes Colonoscopy; Upper gastrointestinal endoscopy; Anterior cruciate ligament repair (Left); and Knee cartilage surgery (Right).     Social History  He reports that he has never smoked. He has never been exposed to tobacco smoke. His smokeless tobacco use includes chew. He reports current alcohol use. He reports that he does not currently use drugs after having used the following drugs: Marijuana.    Review of Systems:    11 point ROS was performed. Pertinent positives are for above complaint.  All other systems were negative.     Family History  No family history on file.     Allergies  Patient has no known allergies.    Physical exam    There were no vitals filed for this visit.    General: No acute distress or pain   Head: Normal cephalic,  atraumatic  Eyes: EOMI; PERRL; Normal lids, sclera, conjunctiva, and cornea; No epiphora; No nystagmus   Ears: Normal pinnae without any external deformities  Nose and Sinuses:  See Endoscopy procedure note below for findings  Oral Cavity: Good dentition; Normal gums, tongue, floor of the mouth, retromolar trigone, buccal mucosa, hard palate are normal; Tonsillar fossae, palate, and uvula are normal without lesions or masses; Symmetric palatal elevation; FOM is soft; Tongue is midline upon protrusion without restriction, and without masses or lesions  Neck: Soft, supple; No lymphadenopathy; No thyromegaly or masses  Neuro: Cranial nerve 2-12 intact; Alert and oriented x3  Skin: Warm and well perfused; no lesions or rashes  Respiratory: Chest symmetric with bilateral expansion   Cardiac: No peripheral edema, no varicosities.     A comprehensive facial exam was performed with the following highlights:    Narrow airway bilaterally.  Deep inspiration /  Moderate inspiration produces dynamic collapse of the {Desc; internal/external:04225} valves.   Modified Yumiko Maneuver: Performed with a cotton tipped applicator, markedly improves breathing bilaterally.     Nasal analysis:     Radiographic Studies: A CT scan of the sinuses performed. {REVIEWED RECORDS:16910}.     Intranasal endoscopic examination performed with limited view on anterior rhinoscopy.    Data reviewed:  {[facility] Reviewed Lab/Imaging/Path Statement:03245}    Procedures:    Procedure: Rigid diagnostic nasal endoscopy  Surgeon:  Vinod Cutler MD  Findings:  A 0-degree rigid endoscope was passed through the patient's bilateral naris via a 3 pass maneuver.  The first pass was along the floor of the nose to the nasopharynx.  The second pass was to the area of the middle meatus.  The third pass was to the sphenoethmoidal recess.    Septum:  Deviation is towards the {LEFT RIGHT BOTH:50695} {caudal, posterior:38364} with nasal obstruction approximately  {Numbers; %:71966} on the {LEFT RIGHT BOTH:73209} side/sides, no perforations, synechia.   Internal Nasal Valve: Angle is reduced, narrowing the nasal airway bilaterally.    Right nasal cavity:      Inferior turbinate:  2+    Inferior meatus:  Clear, no discharge, no polyps/masses/lesions    Middle meatus:  Clear, no discharge, no polyps/masses/lesions   Left nasal cavity:    Inferior turbinate:  2+    Inferior meatus:  Clear, no discharge, no polyps/masses/lesions    Middle meatus:  Clear, no discharge, no polyps/masses/lesions   Nasopharynx:  Clear, no discharge, no masses/lesions    Assessment/Plan:     Carmelo Raymond is a pleasant 59 y.o. male presents with significant mechanical nasal obstruction. The cause is multifactorial, with an obvious septal deviation, turbinate hypertrophy, and static internal nasal valve collapse. There is dynamic nasal valve collapse as well. Correction of this nasal obstruction will require a septoplasty, repair of nasal valve collapse with structural grafting, bilateral turbinate reduction and out fracturing, possible harvest of septal/auricular/or rib cartilage or use of cadaveric rib cartilage. Additionally, the patient's cosmetic concerns were addressed as well. We discussed the medical necessity of this at length, as well as the risks and limitations of the procedures. All questions were answered.     Vinod Cutler MD      Division of Facial Plastic & Reconstructive Surgery  Department of Otolaryngology - Head and Neck Surgery        P: [phone]  F: [phone]

## 2025-03-19 ENCOUNTER — APPOINTMENT (OUTPATIENT)
Facility: CLINIC | Age: 60
End: 2025-03-19
Payer: COMMERCIAL

## 2025-03-19 VITALS — WEIGHT: 225.8 LBS | BODY MASS INDEX: 33.44 KG/M2 | HEIGHT: 69 IN

## 2025-03-19 DIAGNOSIS — G44.221 CHRONIC TENSION-TYPE HEADACHE, INTRACTABLE: Primary | ICD-10-CM

## 2025-03-19 PROCEDURE — 31231 NASAL ENDOSCOPY DX: CPT | Performed by: STUDENT IN AN ORGANIZED HEALTH CARE EDUCATION/TRAINING PROGRAM

## 2025-03-19 PROCEDURE — 3008F BODY MASS INDEX DOCD: CPT | Performed by: STUDENT IN AN ORGANIZED HEALTH CARE EDUCATION/TRAINING PROGRAM

## 2025-03-19 PROCEDURE — 99214 OFFICE O/P EST MOD 30 MIN: CPT | Performed by: STUDENT IN AN ORGANIZED HEALTH CARE EDUCATION/TRAINING PROGRAM

## 2025-03-19 ASSESSMENT — PATIENT HEALTH QUESTIONNAIRE - PHQ9
SUM OF ALL RESPONSES TO PHQ9 QUESTIONS 1 AND 2: 0
1. LITTLE INTEREST OR PLEASURE IN DOING THINGS: NOT AT ALL
2. FEELING DOWN, DEPRESSED OR HOPELESS: NOT AT ALL

## 2025-03-19 ASSESSMENT — PAIN SCALES - GENERAL: PAINLEVEL_OUTOF10: 7

## 2025-03-21 ENCOUNTER — APPOINTMENT (OUTPATIENT)
Dept: RADIOLOGY | Facility: CLINIC | Age: 60
End: 2025-03-21
Payer: COMMERCIAL

## 2025-04-10 ENCOUNTER — APPOINTMENT (OUTPATIENT)
Dept: NEUROLOGY | Facility: CLINIC | Age: 60
End: 2025-04-10
Payer: COMMERCIAL

## 2025-04-10 VITALS
SYSTOLIC BLOOD PRESSURE: 125 MMHG | HEART RATE: 59 BPM | WEIGHT: 225 LBS | DIASTOLIC BLOOD PRESSURE: 76 MMHG | HEIGHT: 69 IN | BODY MASS INDEX: 33.33 KG/M2

## 2025-04-10 DIAGNOSIS — G44.221 CHRONIC TENSION-TYPE HEADACHE, INTRACTABLE: ICD-10-CM

## 2025-04-10 DIAGNOSIS — G50.0 TRIGEMINAL NEURALGIA PAIN: ICD-10-CM

## 2025-04-10 DIAGNOSIS — R51.9 CHRONIC FACIAL PAIN: ICD-10-CM

## 2025-04-10 DIAGNOSIS — G89.29 CHRONIC FACIAL PAIN: ICD-10-CM

## 2025-04-10 DIAGNOSIS — G43.E09 CHRONIC MIGRAINE WITH AURA WITHOUT STATUS MIGRAINOSUS, NOT INTRACTABLE: Primary | ICD-10-CM

## 2025-04-10 PROCEDURE — 3008F BODY MASS INDEX DOCD: CPT

## 2025-04-10 PROCEDURE — 99215 OFFICE O/P EST HI 40 MIN: CPT

## 2025-04-10 RX ORDER — INDOMETHACIN 25 MG/1
25 CAPSULE ORAL 3 TIMES DAILY
Qty: 90 CAPSULE | Refills: 2 | Status: SHIPPED | OUTPATIENT
Start: 2025-04-10 | End: 2025-07-09

## 2025-04-10 RX ORDER — RIZATRIPTAN BENZOATE 10 MG/1
10 TABLET ORAL ONCE AS NEEDED
Qty: 9 TABLET | Refills: 6 | Status: SHIPPED | OUTPATIENT
Start: 2025-04-10

## 2025-04-10 NOTE — PROGRESS NOTES
"Chief Complaint   Patient presents with    Headache     Facial pain, headaches, migraines- follow-up     Subjective   HPI  Carmelo Raymond is a 59 y.o. year old male who presents for follow-up of chief complaint Chronic migraine with aura without status migrainosus, not intractable [G43.E09], chronic facial pain/ trigeminal neuralgia pain. Celiac disease dx 1 year ago, T1DM. No HTN hypercholesterolemia.    Patient states he was referred back \"for follow up on Botox treatment for my daily headaches from the ENT facial Dr Jenkins\"    Initial visit with workup for chronic daily HAs/migraines and facial pain along the trigeminal nerve path. Symptoms started following a dental implant procedure in 2023 and have not remitted despite extraction of the dental implants.  Labs ordered to rule out GCA (not yet completed). Toradol protocol was unsuccessful to alleviate symptoms. Treatment with carbamazepine and meloxicam ineffective, as well.   Dr. Perdomo reviewed MRI, noted deviated septum and presence of a spur. CT of sinuses ordered, referral for ENT placed.     ENT Referral completed, Dr. Hudson suggested he see ENT plastics Dr. Cutler, who discussed with the patient that he was unable to discern if surgery to address the septal spur (right)/ septoplasty would be helpful to the patient for symptom relief.  Suggested neuromodulators to target temporalis/masseter muscles and management of migraines.    Daily headaches/ head pain/ face pain, describes them as throbbing in temples bilaterally. The pain is also in the roof of the mouth.   No identified triggers. No aura, no vision changes.   The patient rates the most severe headaches a 10 in intensity.   Sometimes gets nauseous, denies vomiting. Denies photophobia, phonophobia, dizziness.    Headaches are worsened with exertion.   The headaches are not positional. No change in character with sneezing, coughing and bending over.   Has not found an effective rescue/ " acute treatment, as noted above.     The patient denies the presence of any associated double vision, speech problems, confusion, facial or extremity weakness or numbness or problems with coordination. Denies other neurological history of seizures, stroke, or TIA.    Medications  Current & Past Treatments:  Preventive:  Carbamazepine- ineffective  Gabapentin- ineffective, even at higher doses  Duloxetine- not helpful  Verapamil- started, ineffective  Nortriptyline- not helpful    Rescue:  Rizatriptan started 4/2025  Meloxicam- ineffective  Excedrin Migraine- helpful, but does not last long  Ibuprofen   (Taking omeprazole)  Acetaminophen-    Past meds tried:   Gabapentin- ineffective, even at higher doses  Duloxetine- not helpful  Verapamil- started  Nortriptyline- not helpful    Has not tried  Tegretol-not helpful  SPG- not a candidate- deviated septum/ nasal spur  ZBTN  CEFALY- tried, made worse    Current Outpatient Medications:     azelastine (Astelin) 137 mcg (0.1 %) nasal spray, Administer 2 sprays into each nostril 2 times a day. Use in each nostril as directed, Disp: 30 mL, Rfl: 5    clobetasol (Temovate) 0.05 % cream, Apply topically., Disp: , Rfl:     coenzyme Q-10 100 mg capsule, Take 1 capsule (100 mg) by mouth once daily., Disp: , Rfl:     glucagon (Glucagen) 1 mg injection, 1 time if needed., Disp: , Rfl:     hydrOXYzine HCL (Atarax) 25 mg tablet, Take 1 tablet (25 mg) by mouth every 4 hours if needed., Disp: , Rfl:     ketoconazole (NIZOral) 2 % cream, Apply topically., Disp: , Rfl:     Lyumjev U-100 Insulin 100 unit/mL solution, On pump, Disp: , Rfl:     multivitamin tablet, Take 1 tablet by mouth once daily., Disp: , Rfl:     omeprazole (PriLOSEC) 40 mg DR capsule, Take 1 capsule (40 mg) by mouth once daily in the morning. Take before meals., Disp: , Rfl:     rosuvastatin (Crestor) 20 mg tablet, Take 1 tablet (20 mg) by mouth once daily., Disp: , Rfl:     sildenafil (Viagra) 50 mg tablet, Take 1  "tablet (50 mg) by mouth if needed., Disp: , Rfl:     indomethacin (Indocin) 25 mg capsule, Take 1 capsule (25 mg) by mouth 3 times a day., Disp: 90 capsule, Rfl: 2    rizatriptan (Maxalt) 10 mg tablet, Take 1 tablet (10 mg) by mouth 1 time if needed (Take at the onset of migraine. If ineffective, may repeat in 2 hours. Do not exceed 3 tablets (30 mg) in 24 hours.) for up to 63 doses., Disp: 9 tablet, Rfl: 6    No Known Allergies    Social History     Tobacco Use    Smoking status: Never     Passive exposure: Never    Smokeless tobacco: Current     Types: Chew    Tobacco comments:     Only some days user    Substance Use Topics    Alcohol use: Yes     Comment: moderately     Social History     Substance and Sexual Activity   Drug Use Not Currently    Types: Marijuana      ROS  As noted in HPI, otherwise all other systems have been reviewed are negative for complaint.     Objective   General Appearance: Carmelo is well-developed, well-nourished, 59 y.o. year old male, in no acute distress; appears uncomfortable. Makes good eye contact, is alert, interactive, and cooperative. Demonstrates recent & remote memory recall. Subjective information consistent with objective assessment.     Vitals:    04/10/25 0805   BP: 125/76   Pulse: 59   Weight: 102 kg (225 lb)   Height: 1.753 m (5' 9\")     Lab Results   Component Value Date     10/03/2019    K 4.4 10/03/2019     10/03/2019    BUN 12 10/03/2019    CREATININE 0.96 10/03/2019    CALCIUM 9.2 10/03/2019    HGBA1C 7.3 (A) 03/17/2025    CHOL 140 10/03/2019    HDL 33.5 (A) 10/03/2019    TRIG 113 10/03/2019    TSH 1.41 10/03/2019     Neurological Exam  CRANIAL NERVES:   CN III, IV, VI: Extraocular movements intact bilaterally. Normal lids and orbits bilaterally.  CN VII: Full and symmetric facial movement.  CN VIII: Hearing is normal.    RECORDS REVIEW:  Previous records (provider notes, laboratory reports, and imaging studies were reviewed and summarized).    MR brain " w and wo IV contrast  Status: Final result     PACS Images     Show images for MR brain w and wo IV contrast  Signed by    Signed Time Phone Pager   Mello Henriquez MD PhD 3/23/2023 09:32  [id number]     Exam Information    Status Exam Begun Exam Ended   Final 3/08/2023 14:07      Study Result    Narrative   Interpreted By:  MELLO HENRIQUEZ MD, PHD and EIRCKA MERCADO MD  MRN: [id number]  Patient Name: JS RALPH     STUDY:  MRI BRAIN W/WO CONTRAST;  3/22/2023 7:35 pm  INDICATION:  new onset unilateral headache with pain in the left temple, no prior  history of headaches  COMPARISON:  None.  ORDERING CLINICIAN:  KEVIN PAYNE     TECHNIQUE:  Axial T2, FLAIR, DWI, gradient echo T2 and T1 weighted images of  brain were acquired. Post contrast T1 weighted images were acquired  after administration of 20 mL Dotarem gadolinium based intravenous  contrast.     FINDINGS:  CSF Spaces: The ventricles, sulci and basal cisterns are within  normal limits. No abnormal extra-axial collection.     Parenchyma: There is no restricted diffusion to suggest acute  infarction. Nonspecific T2 hyperintense foci in the white matter,  likely secondary to chronic small vessel ischemic disease. No  evidence of intracranial hemorrhage. There is no mass effect or  midline shift. No abnormal enhancement.     Paranasal Sinuses and Mastoids: Unremarkable.      Impression   No acute intracranial pathology.        No MRI head results found for the past 12 months   No CT head results found for the past 12 months  No CT Angio Head Results found for the past 14 days   No CT Angio Neck Results found for the past  year    Assessment & Plan  Chronic migraine with aura without status migrainosus, not intractable    Orders:    rizatriptan (Maxalt) 10 mg tablet; Take 1 tablet (10 mg) by mouth 1 time if needed (Take at the onset of migraine. If ineffective, may repeat in 2 hours. Do not exceed 3 tablets (30 mg) in 24 hours.) for up to 63 doses.    Follow  Up In Neurology; Future    Chronic facial pain    Orders:    Follow Up In Neurology; Future    Trigeminal neuralgia pain    Orders:    Follow Up In Neurology; Future    Chronic tension-type headache, intractable    Orders:    indomethacin (Indocin) 25 mg capsule; Take 1 capsule (25 mg) by mouth 3 times a day.    Follow Up In Neurology; Future    ASSESSMENT/PLAN:  Complete 2 labs- C-Reactive Protein and Sedimentation rate  Re-start omeprazole daily  Indomethacin-  3 times per day (scheduled)  When pain ramps up into migraine HA, treat with Rizatriptan- take at the onset of migraine symptoms. If ineffective, may repeat in 2 hours. No more than 2 doses in 24 hours.   Follow up 8 to 12 weeks    I personally spent 40 minutes today, exclusive of procedures, providing care for this patient, including preparation, face to face time, documentation and other services such as review of medical records, diagnostic result, patient education, counseling, coordination of care as specified in the encounter.      Gina Kimball, APRN-CNP

## 2025-04-10 NOTE — PATIENT INSTRUCTIONS
Dear Carmelo,    Thank you for coming to [facility]. It was a pleasure caring for you today.  The best way to contact me for medication refills or questions about your care is through [employer].  Otherwise, you can contact the office by phone: [phone].    Gina Kimball, JORDAN-CNP    PLAN:  Complete 2 labs- C-Reactive Protein and   Re-start omeprazole daily  Indomethacin-  3 times per day (scheduled)  For when pain ramps up into migraine HA, treat with Rizatriptan- take at the onset of migraine symptoms. If ineffective, may repeat in 2 hours. No more than 2 doses in 24 hours.   Follow up 8 to 12 weeks

## 2025-04-10 NOTE — ASSESSMENT & PLAN NOTE
Orders:    indomethacin (Indocin) 25 mg capsule; Take 1 capsule (25 mg) by mouth 3 times a day.    Follow Up In Neurology; Future

## 2025-04-16 ENCOUNTER — APPOINTMENT (OUTPATIENT)
Facility: CLINIC | Age: 60
End: 2025-04-16
Payer: COMMERCIAL

## 2025-04-29 ENCOUNTER — APPOINTMENT (OUTPATIENT)
Dept: OTOLARYNGOLOGY | Facility: CLINIC | Age: 60
End: 2025-04-29
Payer: COMMERCIAL

## 2025-05-07 ENCOUNTER — APPOINTMENT (OUTPATIENT)
Dept: NEUROLOGY | Facility: CLINIC | Age: 60
End: 2025-05-07
Payer: COMMERCIAL

## 2025-06-05 NOTE — PATIENT INSTRUCTIONS
Dear Carmelo,    Thank you for coming to [facility]. It was a pleasure caring for you today.  The best way to contact me for medication refills or questions about your care is through [employer].  Otherwise, you can contact the office by phone: [phone].    Gina Kimball, APRN-CNP      PLAN:  Increase indomethacin to 50 mg 3 times per day for 3 days, and then increase to 75 mg 3 times a day for 3 days. Message me to report effectiveness of the dose changes.   Complete scheduled blood work  Schedule nerve block with Dr. Perdomo  Follow up in 6 weeks with Dr. Perdomo

## 2025-06-05 NOTE — ASSESSMENT & PLAN NOTE
Orders:    Follow Up In Neurology; Future    Follow Up In Neurology; Future    indomethacin (Indocin) 50 mg capsule; Take 1 capsule (50 mg) by mouth 3 times a day.

## 2025-06-05 NOTE — PROGRESS NOTES
"Chief Complaint   Patient presents with    Migraine    Follow-up       Subjective   HPI  Carmelo Raymond is a 59 y.o. year old male who presents for follow-up of chief complaint Chronic facial pain [R51.9, G89.29], chronic facial pain/ trigeminal neuralgia pain. Celiac disease dx 1 year ago, T1DM. No HTN/ hypercholesterolemia.    Initial visit with workup for chronic daily HAs/migraines and facial pain along the trigeminal nerve path. Symptoms started following a dental implant procedure in 2023 and have not remitted despite extraction of the dental implants.  Labs ordered to rule out GCA (not yet completed). Toradol protocol was unsuccessful to alleviate symptoms. Treatment with carbamazepine and meloxicam ineffective, as well.     Dr. Perdomo reviewed MRI, noted deviated septum and presence of a spur. CT of sinuses ordered, referral for ENT placed.     ENT Referral completed, Dr. Hudson suggested he see ENT plastics Dr. Cutler, who discussed with the patient that he was unable to discern if surgery to address the septal spur (right)/ septoplasty would be helpful to the patient for symptom relief.  The patient was referred back \"for follow up on Botox treatment for my daily headaches from the ENT facial Dr Jenkins.\" However, this would be an atypical application of Botox.    On 4/10/25, I suggested neuromodulators to target temporalis/masseter muscles and management of migraines.  the patient completed a trial of CEFALY in the office, which made the pain worse.   Indomethacin was started at 25 mg TID, which provided some degree of relief, but he noted wear-off prior to the next dose, and sometimes used 2- 25 mg tablets, or added Excedrin for pain relief. Of all the pain relief regimens that have been tried, indomethacin brought the most relief.     Daily headaches/ head pain/ face pain: describes them as throbbing in temples bilaterally, left > right. The pain radiates towards the back of the left temple, " up and around/ behind the left ear. He is also experiencing associated pulsating pain in the roof of the mouth.  No identified triggers. No aura, no vision changes.   The patient rates the most severe headaches a 10 in intensity.   Sometimes gets nauseous, denies vomiting. Denies photophobia, phonophobia, dizziness.    Headaches are worsened with exertion. The headaches are not positional.     The patient denies the presence of any associated double vision, speech problems, confusion, facial or extremity weakness or numbness or problems with coordination. Denies other neurological history of seizures, stroke, or TIA.    Medications  *Indomethacin: current treatment, most effective thus far.   Preventive:  Carbamazepine- ineffective  Gabapentin- ineffective, even at higher doses  Duloxetine- not helpful  Verapamil- started, ineffective  Nortriptyline- not helpful  Valproic Acid- not sure if he ever started/ tried. (10/23/24- Pati)    Rescue:  Rizatriptan started 4/2025- ineffective  Meloxicam- ineffective  Excedrin Migraine- helpful, but does not last long  Ibuprofen   (Taking omeprazole)  Acetaminophen-  Prednisone- patient is type 1 diabetic    Has not tried  SPG- not a candidate- deviated septum/ nasal spur  ZBTN/ greater auricular nerve block (left)  CEFALY- tried, made worse    Current Outpatient Medications:     azelastine (Astelin) 137 mcg (0.1 %) nasal spray, Administer 2 sprays into each nostril 2 times a day. Use in each nostril as directed, Disp: 30 mL, Rfl: 5    clobetasol (Temovate) 0.05 % cream, Apply topically., Disp: , Rfl:     glucagon (Glucagen) 1 mg injection, 1 time if needed., Disp: , Rfl:     hydrOXYzine HCL (Atarax) 25 mg tablet, Take 1 tablet (25 mg) by mouth every 4 hours if needed., Disp: , Rfl:     indomethacin (Indocin) 25 mg capsule, Take 1 capsule (25 mg) by mouth 3 times a day., Disp: 90 capsule, Rfl: 2    ketoconazole (NIZOral) 2 % cream, Apply topically., Disp: , Rfl:     Lyumjev  "U-100 Insulin 100 unit/mL solution, On pump, Disp: , Rfl:     multivitamin tablet, Take 1 tablet by mouth once daily., Disp: , Rfl:     omeprazole (PriLOSEC) 40 mg DR capsule, Take 1 capsule (40 mg) by mouth once daily in the morning. Take before meals., Disp: , Rfl:     rosuvastatin (Crestor) 20 mg tablet, Take 1 tablet (20 mg) by mouth once daily., Disp: , Rfl:     sildenafil (Viagra) 50 mg tablet, Take 1 tablet (50 mg) by mouth if needed., Disp: , Rfl:     coenzyme Q-10 100 mg capsule, Take 1 capsule (100 mg) by mouth once daily. (Patient not taking: Reported on 6/6/2025), Disp: , Rfl:     No Known Allergies    Social History     Tobacco Use    Smoking status: Never     Passive exposure: Never    Smokeless tobacco: Current     Types: Chew    Tobacco comments:     Only some days user    Substance Use Topics    Alcohol use: Yes     Comment: moderately     Social History     Substance and Sexual Activity   Drug Use Not Currently    Types: Marijuana      ROS  As noted in HPI, otherwise all other systems have been reviewed are negative for complaint.     Objective   General Appearance: Carmelo is well-developed, well-nourished, 59 y.o. year old male, in no acute distress; appears uncomfortable. Makes good eye contact, is alert, interactive, and cooperative. Demonstrates recent & remote memory recall. Subjective information consistent with objective assessment.     Vitals:    06/06/25 0756   BP: 128/80   Pulse: 72   Resp: 17   Temp: 35.9 °C (96.7 °F)   TempSrc: Temporal   Weight: 95.3 kg (210 lb)   Height: 1.778 m (5' 10\")   PainSc: 0-No pain       Lab Results   Component Value Date     10/03/2019    K 4.4 10/03/2019     10/03/2019    BUN 12 10/03/2019    CREATININE 0.96 10/03/2019    CALCIUM 9.2 10/03/2019    HGBA1C 7.3 (A) 03/17/2025    CHOL 140 10/03/2019    HDL 33.5 (A) 10/03/2019    TRIG 113 10/03/2019    TSH 1.41 10/03/2019     Neurological Exam  CRANIAL NERVES:   CN III, IV, VI: Extraocular movements " intact bilaterally. Normal lids and orbits bilaterally.  CN VII: Full and symmetric facial movement.  CN VIII: Hearing is normal.    RECORDS REVIEW:  Previous records (provider notes, laboratory reports, and imaging studies were reviewed and summarized).  MR angio head wo IV contrast (05/29/2024 16:47)   IMPRESSION:  No definite abnormality on time-of-flight MRA of the mevyof-mu-Sshnzo  identified to explain the clinical complaint.    MR brain w and wo IV contrast (03/22/2023 19:35)   Narrative   Interpreted By:  MELLO SR MD, PHD and ERICKA MERCADO MD  MRN: [id number]  Patient Name: JS RALPH     STUDY:  MRI BRAIN W/WO CONTRAST;  3/22/2023 7:35 pm  INDICATION:  new onset unilateral headache with pain in the left temple, no prior  history of headaches  COMPARISON:  None.  ORDERING CLINICIAN:  KEVIN PAYNE     TECHNIQUE:  Axial T2, FLAIR, DWI, gradient echo T2 and T1 weighted images of  brain were acquired. Post contrast T1 weighted images were acquired  after administration of 20 mL Dotarem gadolinium based intravenous  contrast.     FINDINGS:  CSF Spaces: The ventricles, sulci and basal cisterns are within  normal limits. No abnormal extra-axial collection.     Parenchyma: There is no restricted diffusion to suggest acute  infarction. Nonspecific T2 hyperintense foci in the white matter,  likely secondary to chronic small vessel ischemic disease. No  evidence of intracranial hemorrhage. There is no mass effect or  midline shift. No abnormal enhancement.     Paranasal Sinuses and Mastoids: Unremarkable.      Impression   No acute intracranial pathology.          Ishmael Cameron MD  Physician Specialty: Neurology  Progress Notes    Signed  Encounter Date:11/19/24  Assessment/Plan   The patient is tolerating his BiPAP better but he is continuing to have headaches.  I do need a copy of the patient's most recent compliance report.  The patient needs to continue to change his mask and clean his machine  on a regular basis.  The patient should continue to use a chinstrap.  The patient needs to lose weight to his ideal body weight, avoid supine position, improve his sleep hygiene and get a least 8 hours of sleep at night.  The patient should not drive while drowsy.  The patient needs an MRV of the brain without contrast.  I would like the patient to take his Depakote as directed by ALAINA Portillo.  After approximately 4 to 6 weeks, if he still having headaches I would certainly consider giving him Emgality.      Ishmael Cameron MD  Physician Neurology   Progress Notes    Signed   Encounter Date: 4/29/2024    Assessment/Plan   The patient is tolerating his BiPAP better but he is continuing to have headaches..  I do need a copy of the patient's most recent compliance report.  The patient needs to continue to change his mask and clean his machine on a regular basis.  The patient needs to lose weight to his ideal body weight, avoid supine position, improve his sleep hygiene and get a least 8 hours of sleep at night.  The patient should not drive while drowsy.  The patient needs an MRA of the brain without contrast.  I will give him a trial of gabapentin 300 mg p.o. 3 times daily.  I did warn him of the possibility of sedation, weight gain, lower extremity edema and dizziness with this medicine.  The patient should continue all of his medicines and stroke risk factor modification.  The patient should use symptomatic pain medicines for severe headaches.  The patient does need to continue to follow-up with his dentist.  I discussed all these issues in detail with the patient and answered all of his questions.  The patient will follow-up with me in 6 months.       Ishmael Cameron MD  Physician Specialty: Neurology  Progress Notes    Signed  Encounter Date: 9/5/2023     Signed             Patient Discussion/Summary  The patient is still having difficulty tolerating his BiPAP.  The patient does need to stop at the sleep lab  "today to get a mask refit and possibly change to a new mask.  After he gets either one of these, I would like to check a repeat compliance report after 2 weeks.  If his AHI is still high then I will increase his BiPAP pressure.  The patient does need to follow-up with his dentist and needs to be evaluated for TMJ.  1  If he ultimately cannot tolerate BiPAP then I will consider an ENT consult for possible surgical therapies and Inspire implantation.              Assessment & Plan  Chronic facial pain    Orders:    Follow Up In Neurology; Future    Follow Up In Neurology; Future    Trigeminal neuralgia pain    Orders:    Follow Up In Neurology; Future    Follow Up In Neurology; Future    Chronic tension-type headache, intractable    Orders:    Follow Up In Neurology; Future    Follow Up In Neurology; Future    indomethacin (Indocin) 50 mg capsule; Take 1 capsule (50 mg) by mouth 3 times a day.    Chronic migraine with aura without status migrainosus, not intractable    Orders:    Follow Up In Neurology; Future    Follow Up In Neurology; Future    Hemicrania continua    Orders:    Follow Up In Neurology; Future    Follow Up In Neurology; Future    indomethacin (Indocin) 50 mg capsule; Take 1 capsule (50 mg) by mouth 3 times a day.      Nerve block with Dr. Perdomo to target greater auricular nerve/ \"follow the pain\"  ASSESSMENT/PLAN:  Increase indomethacin to 50 mg 3 times per day for 3 days, and then increase to 75 mg 3 times a day for 3 days. Message me to report effectiveness of the dose changes.   Complete scheduled blood work  Schedule nerve block with Dr. Perdomo  Follow up in 6 weeks with Dr. Perdomo    I personally spent 20 minutes today, exclusive of procedures, providing care for this patient, including preparation, face to face time, documentation and other services such as review of medical records, diagnostic result, patient education, counseling, coordination of care as specified in the encounter.      Gina HARP" JORDAN Kimball-CNP

## 2025-06-06 ENCOUNTER — APPOINTMENT (OUTPATIENT)
Dept: NEUROLOGY | Facility: CLINIC | Age: 60
End: 2025-06-06
Payer: COMMERCIAL

## 2025-06-06 VITALS
RESPIRATION RATE: 17 BRPM | BODY MASS INDEX: 30.06 KG/M2 | SYSTOLIC BLOOD PRESSURE: 128 MMHG | HEIGHT: 70 IN | HEART RATE: 72 BPM | TEMPERATURE: 96.7 F | DIASTOLIC BLOOD PRESSURE: 80 MMHG | WEIGHT: 210 LBS

## 2025-06-06 DIAGNOSIS — G89.29 CHRONIC FACIAL PAIN: Primary | ICD-10-CM

## 2025-06-06 DIAGNOSIS — G50.0 TRIGEMINAL NEURALGIA PAIN: ICD-10-CM

## 2025-06-06 DIAGNOSIS — G43.E09 CHRONIC MIGRAINE WITH AURA WITHOUT STATUS MIGRAINOSUS, NOT INTRACTABLE: ICD-10-CM

## 2025-06-06 DIAGNOSIS — G44.51 HEMICRANIA CONTINUA: ICD-10-CM

## 2025-06-06 DIAGNOSIS — G44.221 CHRONIC TENSION-TYPE HEADACHE, INTRACTABLE: ICD-10-CM

## 2025-06-06 DIAGNOSIS — R51.9 CHRONIC FACIAL PAIN: Primary | ICD-10-CM

## 2025-06-06 PROCEDURE — 99213 OFFICE O/P EST LOW 20 MIN: CPT

## 2025-06-06 PROCEDURE — 3008F BODY MASS INDEX DOCD: CPT

## 2025-06-06 RX ORDER — INDOMETHACIN 50 MG/1
50 CAPSULE ORAL 3 TIMES DAILY
Qty: 90 CAPSULE | Refills: 2 | Status: SHIPPED | OUTPATIENT
Start: 2025-06-06 | End: 2025-09-04

## 2025-06-06 ASSESSMENT — PATIENT HEALTH QUESTIONNAIRE - PHQ9
SUM OF ALL RESPONSES TO PHQ9 QUESTIONS 1 AND 2: 0
2. FEELING DOWN, DEPRESSED OR HOPELESS: NOT AT ALL
1. LITTLE INTEREST OR PLEASURE IN DOING THINGS: NOT AT ALL

## 2025-06-06 ASSESSMENT — PAIN SCALES - GENERAL: PAINLEVEL_OUTOF10: 0-NO PAIN

## 2025-06-06 NOTE — Clinical Note
I had this patient schedule a nerve block with you- pain is not traditional ONB area, but wraps around from the roof of his mouth up, and around/ behind the left ear/ back of the head. You had previously looked at his imaging, suggested ENT for a spur. He is now back to us, and I'd like your take on his case. Currently having him complete an indomethacin trial- low dose helped some, but pain returning.   6/25 nerve block with you, 7/17 follow up with you to discuss any other treatment ideas you have. Thanks!

## 2025-06-12 LAB
CRP SERPL-MCNC: <3 MG/L
ERYTHROCYTE [SEDIMENTATION RATE] IN BLOOD BY WESTERGREN METHOD: 2 MM/H

## 2025-06-17 ENCOUNTER — TELEPHONE (OUTPATIENT)
Dept: NEUROLOGY | Facility: CLINIC | Age: 60
End: 2025-06-17
Payer: COMMERCIAL

## 2025-06-17 DIAGNOSIS — G44.221 CHRONIC TENSION-TYPE HEADACHE, INTRACTABLE: ICD-10-CM

## 2025-06-17 DIAGNOSIS — G44.51 HEMICRANIA CONTINUA: ICD-10-CM

## 2025-06-17 DIAGNOSIS — R51.9 CHRONIC FACIAL PAIN: ICD-10-CM

## 2025-06-17 DIAGNOSIS — G50.0 TRIGEMINAL NEURALGIA PAIN: Primary | ICD-10-CM

## 2025-06-17 DIAGNOSIS — G89.29 CHRONIC FACIAL PAIN: ICD-10-CM

## 2025-06-17 RX ORDER — INDOMETHACIN 25 MG/1
25 CAPSULE ORAL 3 TIMES DAILY
Qty: 90 CAPSULE | Refills: 2 | Status: SHIPPED | OUTPATIENT
Start: 2025-06-17 | End: 2025-09-15

## 2025-06-17 NOTE — PROGRESS NOTES
Patient requesting 75 mg indomethacin.     Awaiting patient feedback of indomethacin trial, and will adjust the dose accordingly.     ---     Patient replied, states some improvement with indomethacin. Will have him continue for now. (75 mg TID)  Suggested for patient to schedule CT of sinuses that was ordered in March.   Patient following up with Dr. Perdomo 6/25.         Gina Kimball, APRN-CNP

## 2025-06-25 ENCOUNTER — APPOINTMENT (OUTPATIENT)
Dept: NEUROLOGY | Facility: CLINIC | Age: 60
End: 2025-06-25
Payer: COMMERCIAL

## 2025-06-25 VITALS
RESPIRATION RATE: 20 BRPM | BODY MASS INDEX: 30.13 KG/M2 | DIASTOLIC BLOOD PRESSURE: 70 MMHG | SYSTOLIC BLOOD PRESSURE: 120 MMHG | HEART RATE: 72 BPM | WEIGHT: 210 LBS

## 2025-06-25 DIAGNOSIS — M79.2 NEURALGIA: Primary | ICD-10-CM

## 2025-06-25 RX ORDER — SILDENAFIL 100 MG/1
TABLET, FILM COATED ORAL
COMMUNITY
Start: 2025-03-17

## 2025-06-30 ENCOUNTER — HOSPITAL ENCOUNTER (OUTPATIENT)
Dept: RADIOLOGY | Facility: CLINIC | Age: 60
Discharge: HOME | End: 2025-06-30
Payer: COMMERCIAL

## 2025-06-30 ENCOUNTER — TELEPHONE (OUTPATIENT)
Dept: NEUROLOGY | Facility: HOSPITAL | Age: 60
End: 2025-06-30
Payer: COMMERCIAL

## 2025-06-30 DIAGNOSIS — J34.2 DEVIATED NASAL SEPTUM: ICD-10-CM

## 2025-06-30 DIAGNOSIS — R51.9 CHRONIC FACIAL PAIN: ICD-10-CM

## 2025-06-30 DIAGNOSIS — G44.221 CHRONIC TENSION-TYPE HEADACHE, INTRACTABLE: ICD-10-CM

## 2025-06-30 DIAGNOSIS — G89.29 CHRONIC FACIAL PAIN: ICD-10-CM

## 2025-06-30 PROCEDURE — 70486 CT MAXILLOFACIAL W/O DYE: CPT

## 2025-06-30 PROCEDURE — 70486 CT MAXILLOFACIAL W/O DYE: CPT | Performed by: RADIOLOGY

## 2025-07-02 ENCOUNTER — PROCEDURE VISIT (OUTPATIENT)
Dept: NEUROLOGY | Facility: CLINIC | Age: 60
End: 2025-07-02
Payer: COMMERCIAL

## 2025-07-02 VITALS
DIASTOLIC BLOOD PRESSURE: 76 MMHG | SYSTOLIC BLOOD PRESSURE: 140 MMHG | HEART RATE: 64 BPM | WEIGHT: 210 LBS | BODY MASS INDEX: 30.13 KG/M2

## 2025-07-02 DIAGNOSIS — G43.711 INTRACTABLE CHRONIC MIGRAINE WITHOUT AURA AND WITH STATUS MIGRAINOSUS: Primary | ICD-10-CM

## 2025-07-02 RX ORDER — BETAMETHASONE SODIUM PHOSPHATE AND BETAMETHASONE ACETATE 3; 3 MG/ML; MG/ML
12 INJECTION, SUSPENSION INTRA-ARTICULAR; INTRALESIONAL; INTRAMUSCULAR; SOFT TISSUE ONCE
Status: COMPLETED | OUTPATIENT
Start: 2025-07-02 | End: 2025-07-02

## 2025-07-02 RX ORDER — BUPIVACAINE HYDROCHLORIDE 5 MG/ML
30 INJECTION, SOLUTION EPIDURAL; INTRACAUDAL; PERINEURAL ONCE
Status: COMPLETED | OUTPATIENT
Start: 2025-07-02 | End: 2025-07-02

## 2025-07-02 RX ORDER — LIDOCAINE HYDROCHLORIDE 10 MG/ML
5 INJECTION, SOLUTION INFILTRATION; PERINEURAL ONCE
Status: COMPLETED | OUTPATIENT
Start: 2025-07-02 | End: 2025-07-02

## 2025-07-02 RX ADMIN — BETAMETHASONE SODIUM PHOSPHATE AND BETAMETHASONE ACETATE 12 MG: 3; 3 INJECTION, SUSPENSION INTRA-ARTICULAR; INTRALESIONAL; INTRAMUSCULAR; SOFT TISSUE at 14:42

## 2025-07-02 RX ADMIN — LIDOCAINE HYDROCHLORIDE 5 ML: 10 INJECTION, SOLUTION INFILTRATION; PERINEURAL at 14:42

## 2025-07-02 RX ADMIN — BUPIVACAINE HYDROCHLORIDE 150 MG: 5 INJECTION, SOLUTION EPIDURAL; INTRACAUDAL; PERINEURAL at 14:42

## 2025-07-02 ASSESSMENT — PAIN SCALES - GENERAL: PAINLEVEL_OUTOF10: 4

## 2025-07-02 NOTE — PROGRESS NOTES
"Patient ID: Carmelo Raymond \"Carlos" is a 59 y.o. male.    Head/Face/Jaw Botulinum Injection    Date/Time: 7/2/2025 1:44 PM    Performed by: Jeanie Perdomo MD  Authorized by: Jeanie Perdomo MD      Consent:      Consent obtained:  Verbal     Consent given by:  Patient    Procedure details:      EMG used?  No     Electrical stimulation used?  No     Diluted by:  Preservative free saline     Medications: 25 Units onabotulinumtoxinA 100 unit      Comments about vials and dilution:  Sample     Total units available:  25       Ad hoc region injected:  Head see diagram with 25 units     Total units injected:  25     Total units wasted:  0    Post-procedure details:      Patient tolerance of procedure:  Tolerated well, no immediate complications    Comments: Please do not rub areas for 24 hours.  No pressure above eyebrows for 24 hours.  Watch out for helmets, headlamps, headbands, goggles, or massage for 24 hours.  If there is discomfort, ice for the first 24 hour,s heat after that.  Headaches may worsen, or you may experience neck stiffness.  If this occurs use your usual headache medication or a mild anti inflammatory such as advil or aleve.  Please call if you have difficulty swallowing.        "

## 2025-07-08 DIAGNOSIS — J30.1 SEASONAL ALLERGIC RHINITIS DUE TO POLLEN: ICD-10-CM

## 2025-07-08 RX ORDER — AZELASTINE 1 MG/ML
2 SPRAY, METERED NASAL 2 TIMES DAILY
Qty: 90 ML | Refills: 0 | Status: SHIPPED | OUTPATIENT
Start: 2025-07-08 | End: 2026-07-08

## 2025-07-09 DIAGNOSIS — M95.0 NASAL DEFORMITY: Primary | ICD-10-CM

## 2025-07-16 ENCOUNTER — APPOINTMENT (OUTPATIENT)
Dept: NEUROLOGY | Facility: CLINIC | Age: 60
End: 2025-07-16
Payer: COMMERCIAL

## 2025-07-16 VITALS
HEART RATE: 72 BPM | RESPIRATION RATE: 20 BRPM | DIASTOLIC BLOOD PRESSURE: 70 MMHG | BODY MASS INDEX: 30.13 KG/M2 | SYSTOLIC BLOOD PRESSURE: 140 MMHG | WEIGHT: 210 LBS

## 2025-07-16 DIAGNOSIS — G43.711 INTRACTABLE CHRONIC MIGRAINE WITHOUT AURA AND WITH STATUS MIGRAINOSUS: Primary | ICD-10-CM

## 2025-07-16 PROCEDURE — 64615 CHEMODENERV MUSC MIGRAINE: CPT | Performed by: PSYCHIATRY & NEUROLOGY

## 2025-07-16 NOTE — PROGRESS NOTES
"Patient ID: Carmelo Raymond \"Carlos" is a 60 y.o. male.    Head/Face/Jaw Botulinum Injection    Date/Time: 7/16/2025 5:18 PM    Performed by: Jeanie Perdomo MD  Authorized by: Jeanie Perdomo MD      Consent:      Consent obtained:  Verbal     Consent given by:  Patient    Procedure details:      EMG used?  No     Electrical stimulation used?  No     Diluted by:  Preservative free saline     Medications: 200 Units onabotulinumtoxinA 100 unit      Total units available:  100       Ad hoc region injected:  Head see diagram with 50 units     Total units injected:  50     Total units wasted:  50    Post-procedure details:      Patient tolerance of procedure:  Tolerated well, no immediate complications    Comments: Please do not rub areas for 24 hours.  No pressure above eyebrows for 24 hours.  Watch out for helmets, headlamps, headbands, goggles, or massage for 24 hours.  If there is discomfort, ice for the first 24 hour,s heat after that.  Headaches may worsen, or you may experience neck stiffness.  If this occurs use your usual headache medication or a mild anti inflammatory such as advil or aleve.  Please call if you have difficulty swallowing.        "

## 2025-07-17 ENCOUNTER — APPOINTMENT (OUTPATIENT)
Dept: NEUROLOGY | Facility: CLINIC | Age: 60
End: 2025-07-17
Payer: COMMERCIAL

## 2025-07-30 ENCOUNTER — APPOINTMENT (OUTPATIENT)
Dept: NEUROLOGY | Facility: CLINIC | Age: 60
End: 2025-07-30
Payer: COMMERCIAL

## 2025-07-31 ENCOUNTER — APPOINTMENT (OUTPATIENT)
Dept: NEUROLOGY | Facility: CLINIC | Age: 60
End: 2025-07-31
Payer: COMMERCIAL

## 2025-08-05 ENCOUNTER — APPOINTMENT (OUTPATIENT)
Dept: NEUROLOGY | Facility: CLINIC | Age: 60
End: 2025-08-05
Payer: COMMERCIAL

## 2025-08-18 ENCOUNTER — APPOINTMENT (OUTPATIENT)
Dept: NEUROLOGY | Facility: CLINIC | Age: 60
End: 2025-08-18
Payer: COMMERCIAL

## 2025-09-02 ENCOUNTER — APPOINTMENT (OUTPATIENT)
Dept: NEUROLOGY | Facility: CLINIC | Age: 60
End: 2025-09-02
Payer: COMMERCIAL

## 2025-09-03 DIAGNOSIS — J30.1 SEASONAL ALLERGIC RHINITIS DUE TO POLLEN: ICD-10-CM

## 2025-09-03 RX ORDER — AZELASTINE 1 MG/ML
SPRAY, METERED NASAL
Qty: 90 ML | Refills: 3 | Status: SHIPPED | OUTPATIENT
Start: 2025-09-03

## 2025-09-24 ENCOUNTER — APPOINTMENT (OUTPATIENT)
Dept: ALLERGY | Facility: CLINIC | Age: 60
End: 2025-09-24
Payer: COMMERCIAL

## 2025-10-16 ENCOUNTER — APPOINTMENT (OUTPATIENT)
Dept: NEUROLOGY | Facility: CLINIC | Age: 60
End: 2025-10-16
Payer: COMMERCIAL